# Patient Record
Sex: FEMALE | Race: WHITE
[De-identification: names, ages, dates, MRNs, and addresses within clinical notes are randomized per-mention and may not be internally consistent; named-entity substitution may affect disease eponyms.]

---

## 2017-01-31 ENCOUNTER — HOSPITAL ENCOUNTER (OUTPATIENT)
Dept: HOSPITAL 80 - FCATH | Age: 62
Setting detail: OBSERVATION
LOS: 1 days | Discharge: HOME | End: 2017-02-01
Attending: INTERNAL MEDICINE | Admitting: INTERNAL MEDICINE
Payer: COMMERCIAL

## 2017-01-31 DIAGNOSIS — I10: ICD-10-CM

## 2017-01-31 DIAGNOSIS — I47.1: Primary | ICD-10-CM

## 2017-01-31 DIAGNOSIS — E78.5: ICD-10-CM

## 2017-01-31 LAB
% IMMATURE GRANULYOCYTES: 0.3 % (ref 0–1.1)
ABSOLUTE IMMATURE GRANULOCYTES: 0.02 10^3/UL (ref 0–0.1)
ABSOLUTE NRBC COUNT: 0 10^3/UL (ref 0–0.01)
ADD DIFF?: NO
ADD MORPH?: NO
ADD SCAN?: NO
ANION GAP SERPL CALC-SCNC: 10 MEQ/L (ref 8–16)
ANION GAP SERPL CALC-SCNC: 9 MEQ/L (ref 8–16)
APTT BLD: 28.4 SEC (ref 23–38)
ATYPICAL LYMPHOCYTE FLAG: 0 (ref 0–99)
CALCIUM SERPL-MCNC: 8 MG/DL (ref 8.5–10.4)
CALCIUM SERPL-MCNC: 9.2 MG/DL (ref 8.5–10.4)
CHLORIDE SERPL-SCNC: 109 MEQ/L (ref 97–110)
CHLORIDE SERPL-SCNC: 112 MEQ/L (ref 97–110)
CO2 SERPL-SCNC: 22 MEQ/L (ref 22–31)
CO2 SERPL-SCNC: 24 MEQ/L (ref 22–31)
CREAT SERPL-MCNC: 0.6 MG/DL (ref 0.6–1)
CREAT SERPL-MCNC: 0.6 MG/DL (ref 0.6–1)
ERYTHROCYTE [DISTWIDTH] IN BLOOD BY AUTOMATED COUNT: 12.8 % (ref 11.5–15.2)
FRAGMENT RBC FLAG: 0 (ref 0–99)
GFR SERPL CREATININE-BSD FRML MDRD: > 60 ML/MIN/{1.73_M2}
GFR SERPL CREATININE-BSD FRML MDRD: > 60 ML/MIN/{1.73_M2}
GLUCOSE SERPL-MCNC: 80 MG/DL (ref 70–100)
GLUCOSE SERPL-MCNC: 82 MG/DL (ref 70–100)
HCT VFR BLD CALC: 42.1 % (ref 38–47)
HGB BLD-MCNC: 14.2 G/DL (ref 12.6–16.3)
INR PPP: 0.94 (ref 0.83–1.16)
LEFT SHIFT FLG: 0 (ref 0–99)
LIPEMIA HEMOLYSIS FLAG: 80 (ref 0–99)
MAGNESIUM SERPL-MCNC: 1.9 MG/DL (ref 1.6–2.3)
MAGNESIUM SERPL-MCNC: 2 MG/DL (ref 1.6–2.3)
MCH RBC BLDCO QN: 29.9 PG (ref 27.9–34.1)
MCHC RBC AUTO-ENTMCNC: 33.7 G/DL (ref 32.4–36.7)
MCV RBC AUTO: 88.6 FL (ref 81.5–99.8)
NRBC-AUTO%: 0 % (ref 0–0.2)
PLATELET # BLD: 237 10^3/UL (ref 150–400)
PLATELET CLUMPS FLAG: 10 (ref 0–99)
PMV BLD AUTO: 9.6 FL (ref 8.7–11.7)
POTASSIUM SERPL-SCNC: 3.7 MEQ/L (ref 3.5–5.2)
POTASSIUM SERPL-SCNC: 4.3 MEQ/L (ref 3.5–5.2)
PROTHROMBIN TIME: 12.5 SEC (ref 12–15)
RBC # BLD AUTO: 4.75 10^6/UL (ref 4.18–5.33)
SODIUM SERPL-SCNC: 143 MEQ/L (ref 134–144)
SODIUM SERPL-SCNC: 143 MEQ/L (ref 134–144)

## 2017-01-31 PROCEDURE — 93613 INTRACARDIAC EPHYS 3D MAPG: CPT

## 2017-01-31 PROCEDURE — 93306 TTE W/DOPPLER COMPLETE: CPT

## 2017-01-31 PROCEDURE — 02H73MZ INSERTION OF CARDIAC LEAD INTO LEFT ATRIUM, PERCUTANEOUS APPROACH: ICD-10-PCS | Performed by: INTERNAL MEDICINE

## 2017-01-31 PROCEDURE — 02K83ZZ MAP CONDUCTION MECHANISM, PERCUTANEOUS APPROACH: ICD-10-PCS | Performed by: INTERNAL MEDICINE

## 2017-01-31 PROCEDURE — C1731 CATH, EP, 20 OR MORE ELEC: HCPCS

## 2017-01-31 PROCEDURE — G0378 HOSPITAL OBSERVATION PER HR: HCPCS

## 2017-01-31 PROCEDURE — 93653 COMPRE EP EVAL TX SVT: CPT

## 2017-01-31 PROCEDURE — C1732 CATH, EP, DIAG/ABL, 3D/VECT: HCPCS

## 2017-01-31 PROCEDURE — 4A023FZ MEASUREMENT OF CARDIAC RHYTHM, PERCUTANEOUS APPROACH: ICD-10-PCS | Performed by: INTERNAL MEDICINE

## 2017-01-31 PROCEDURE — 025K3ZZ DESTRUCTION OF RIGHT VENTRICLE, PERCUTANEOUS APPROACH: ICD-10-PCS | Performed by: INTERNAL MEDICINE

## 2017-01-31 PROCEDURE — C1730 CATH, EP, 19 OR FEW ELECT: HCPCS

## 2017-01-31 PROCEDURE — 02563ZZ DESTRUCTION OF RIGHT ATRIUM, PERCUTANEOUS APPROACH: ICD-10-PCS | Performed by: INTERNAL MEDICINE

## 2017-01-31 PROCEDURE — 93005 ELECTROCARDIOGRAM TRACING: CPT

## 2017-01-31 PROCEDURE — 02573ZZ DESTRUCTION OF LEFT ATRIUM, PERCUTANEOUS APPROACH: ICD-10-PCS | Performed by: INTERNAL MEDICINE

## 2017-01-31 PROCEDURE — 5A1223Z PERFORMANCE OF CARDIAC PACING, CONTINUOUS: ICD-10-PCS | Performed by: INTERNAL MEDICINE

## 2017-01-31 PROCEDURE — 93621 COMP EP EVL L PAC&REC C SINS: CPT

## 2017-01-31 PROCEDURE — C1893 INTRO/SHEATH, FIXED,NON-PEEL: HCPCS

## 2017-01-31 PROCEDURE — 93623 PRGRMD STIMJ&PACG IV RX NFS: CPT

## 2017-01-31 NOTE — CPEKG
Heart Rate: 92

RR Interval: 652

P-R Interval: 144

QRSD Interval: 72

QT Interval: 376

QTC Interval: 466

P Axis: 66

QRS Axis: -11

T Wave Axis: 45

EKG Severity - BORDERLINE ECG -

EKG Impression: SINUS RHYTHM

EKG Impression: BORDERLINE T ABNORMALITIES, ANTERIOR LEADS

Electronically Signed By: Shea Chavez 31-Jan-2017 16:29:39

## 2017-01-31 NOTE — CPEKG
Heart Rate: 82

RR Interval: 732

P-R Interval: 124

QRSD Interval: 70

QT Interval: 384

QTC Interval: 449

P Axis: 42

QRS Axis: -31

T Wave Axis: 21

EKG Severity - BORDERLINE ECG -

EKG Impression: SINUS RHYTHM

EKG Impression: LEFT AXIS DEVIATION

EKG Impression: BORDERLINE R WAVE PROGRESSION, ANTERIOR LEADS

EKG Impression: BORDERLINE T ABNORMALITIES, ANTERIOR LEADS

Electronically Signed By: Shea Chavez 31-Jan-2017 12:40:45

## 2017-01-31 NOTE — EPPROC
Electrophysiology Procedure Note: 


ELECTROPHYSIOLOGIC STUDY AND   CATHETER MEDIATED ABLATION OF SLOW/FAST AV ERAN 

REENTRY TACHYCARDIA





PROCEDURES PERFORMED:





39755-25    EP evaluation with RA/RV/LA pace/record, with arrhythmia induction





71557-42    EP evaluation with RA/RV pace record, insert/reposition catheter, 

with arrhythmia induction





89698      Intracardiac catheter ablation, SVT arrhythmogenic focus 





52597      3D mapping





Fluoroscopy








INDICATION:





SVT





PROCEDURE:





Catheters & Anesthesia: 





The patient arrived in the Electrophysiology Laboratory in the fasting state.  

The right clavicular region, right groin, and left groin area were prepped and 

draped in the usual sterile manner.  Anesthesiologist Dr. LADARIUS Manzanares 

administered general anesthesia.   Appropriate non-invasive blood pressure, 

pulse oximetry and end-tidal CO2 monitoring was established.


All catheters were placed percutaneously using the modified Seldinger technique

, and advanced into position under fluoroscopic guidance. One #6 Macanese 

hexapolar non-deflectable electrode catheter was inserted into the right atrial 

appendage via the left femoral vein (2mm spacing; except the proximal ring 

which was 25cm from the tip - used for unipolar recordings).  One #7 Macanese 

deflectable octapolar electrode catheter was advanced to the His-bundle 

position via the left femoral vein (2mm spacing).  One #7 Macanese deflectable 

quadrapolar catheter was advanced to the anteroseptal right ventricle via the 

right femoral vein.   One #7 Macanese deflectable catheter with 10 pairs of 

electrodes was placed via the right femoral vein into the coronary sinus. 


Heparin was given to keep ACT > 200 s.





Programmed stimulation was performed from the right atrium, right ventricle and 

coronary sinus (left atrium).   Parahisian pacing demonstrated constant H-A 

interval with changing V-A intervals and stimulus-A intervals during capture 

and loss of capture of proximal RBB proving retrograde conduction over AV node.

  AVNRT was induced easily after bolus of isoproterenol 2 mcg/min.  Ventricular 

extrastimuli delivered during tachycardia without altering antegrade His bundle 

activation did not advance next atrial potential, indicating that the 

tachycardia was not utilizing an accessory pathway for retrograde conduction. 

VA interval was 15 ms. Post entrainment of the tachycardia from the ventricle, 

there was VAHV response.





Mapping of the right atrium and coronary sinus during AVNRT identified earliest 

atrial activation above the tendon of Cj at a level slightly posterior to 

the level of the His bundle, consistent with retrograde conduction over the 

fast AV eran pathway.    





A #8 Macanese deflectable quadrapolar electrode catheter (2mm-5mm-2mm spacing) 

with 4 mm tip electrode and sensor for the 3D mapping Carto system was advanced 

to the right atrium. 3 D mapping of the inter-atrial septum and coronary sinus 

was performed and location of the AV node was marked.





 A SL2 sheath was used.  RF applications were delivered to the region between 

the tricuspid annulus   and the coronary sinus ostium, at the level of the 

upper edge of the coronary sinus ostium. Radiofrequency applications were also 

delivered along the roof of the proximal coronary sinus.    Junctional rhythm 

occurred during all of the RF applications.





Programmed stimulation was continued   post ablation at baseline and during 

graded doses of isoproterenol upto 4mcg/min.   Sustained AVNRT was not 

inducible.  There were no echo beats. Nonsustained atrial tachycardia, 5 seconds

, 330 ms was seen x 1, not targeted for ablation.





The catheters were removed.  The long sheath was changed to a short 9 Fr 

sheath. The patient was transferred to the cardiovascular holding area in 

stable condition.  Vascular access sheaths were removed in the holding area.  

There were no apparent complications.





Results:





A.   Spontaneous Intervals:


Pre ablation     ms AH 60 ms HV 40 ms


Post ablation   ms AH 45 ms HV 40 ms





B.   Antegrade AV eran function (decremental pacing)


Pre ablation    FPERP 390 ms SPERP 340 ms WBB  ms then SVT


Post ablation    FPERP 360 ms   WBB  ms





C.   Retrograde AV eran function (decremental pacing)


Pre ablation    FPERP 360 ms   WBB  ms 





D.    Arrhythmias:


Sustained slow/fast AVNRT


Cycle length 300 ms, AH interval 260 ms, HA interval 40 ms 


VA interval 15 ms








CONCLUSIONS





1.   AV eran reentrant tachycardia using the slow AV eran pathway for 

antegrade conduction and the fast AV eran pathway for retrograde conduction. (

Slow/fast AVNRT). 


2.   Successful ablation of the slow AV eran pathway with elimination of 1:1 

antegrade conduction over the slow AV eran pathway, all retrograde conduction 

over the slow AV eran pathway and the inducibility of AVNRT.


3.   No complications.








Patient Problems: 


 Problems











Problem Status Diagnosed


 


Supraventricular tachycardia Acute 


 


TIA (transient ischemic attack) Acute

## 2017-02-01 VITALS
RESPIRATION RATE: 12 BRPM | HEART RATE: 75 BPM | TEMPERATURE: 98 F | SYSTOLIC BLOOD PRESSURE: 125 MMHG | OXYGEN SATURATION: 94 % | DIASTOLIC BLOOD PRESSURE: 81 MMHG

## 2017-02-01 LAB
% IMMATURE GRANULYOCYTES: 0.3 % (ref 0–1.1)
ABSOLUTE IMMATURE GRANULOCYTES: 0.02 10^3/UL (ref 0–0.1)
ABSOLUTE NRBC COUNT: 0 10^3/UL (ref 0–0.01)
ADD DIFF?: NO
ADD MORPH?: NO
ADD SCAN?: NO
ANION GAP SERPL CALC-SCNC: 5 MEQ/L (ref 8–16)
ATYPICAL LYMPHOCYTE FLAG: 0 (ref 0–99)
CALCIUM SERPL-MCNC: 8.8 MG/DL (ref 8.5–10.4)
CHLORIDE SERPL-SCNC: 108 MEQ/L (ref 97–110)
CO2 SERPL-SCNC: 26 MEQ/L (ref 22–31)
CREAT SERPL-MCNC: 0.7 MG/DL (ref 0.6–1)
CREATINE KINASE-MB FRACTION: 1.5 NG/ML (ref 0–3.19)
ERYTHROCYTE [DISTWIDTH] IN BLOOD BY AUTOMATED COUNT: 13.1 % (ref 11.5–15.2)
FRAGMENT RBC FLAG: 0 (ref 0–99)
GFR SERPL CREATININE-BSD FRML MDRD: > 60 ML/MIN/{1.73_M2}
GLUCOSE SERPL-MCNC: 99 MG/DL (ref 70–100)
HCT VFR BLD CALC: 41.4 % (ref 38–47)
HGB BLD-MCNC: 13.5 G/DL (ref 12.6–16.3)
INR PPP: 0.98 (ref 0.83–1.16)
LEFT SHIFT FLG: 0 (ref 0–99)
LIPEMIA HEMOLYSIS FLAG: 80 (ref 0–99)
MCH RBC BLDCO QN: 29.9 PG (ref 27.9–34.1)
MCHC RBC AUTO-ENTMCNC: 32.6 G/DL (ref 32.4–36.7)
MCV RBC AUTO: 91.6 FL (ref 81.5–99.8)
NRBC-AUTO%: 0 % (ref 0–0.2)
PLATELET # BLD: 237 10^3/UL (ref 150–400)
PLATELET CLUMPS FLAG: 0 (ref 0–99)
PMV BLD AUTO: 9.9 FL (ref 8.7–11.7)
POTASSIUM SERPL-SCNC: 5.1 MEQ/L (ref 3.5–5.2)
PROTHROMBIN TIME: 12.9 SEC (ref 12–15)
RBC # BLD AUTO: 4.52 10^6/UL (ref 4.18–5.33)
SODIUM SERPL-SCNC: 139 MEQ/L (ref 134–144)
TROPONIN I SERPL-MCNC: 0.17 NG/ML (ref 0–0.03)

## 2017-02-01 NOTE — ECHO
2900032.003BLD

[id number]



+---------------------------------------------------+      [address]

:                                                   :       [place] [address]

:                                                   :           [phone]

+---------------------------------------------------+       Fax [phone]



                       Adult Echocardiographic Report



+----------------------------------------------------------------------------

---+

:Name: DARRON LEARY Anastacio Date: 2017 08:39 AM                          

   :

:MRN: [id number]    Hospital Admission Number: [id number] Location: 

222:

:: 1955    Gender: Female                         Height: 64 in     

   :

:Age: 62 yrs        Race: WH                               Weight: 171 lb    

   :

:Reason For Study: Eval LV FX                                                

   :

:                                                          BSA: 1.8 meters2  

   :

:History: Post Ablation                                                      

   :

+----------------------------------------------------------------------------

---+

MMode/2D Measurements & Calculations

IVSd: 0.88 cm   LVIDd: 4.5 cm  FS: 43.8 %            Ao root diam: 3.2 cm

LVPWd: 0.95 cm  LVIDs: 2.6 cm  EDV(Teich): 94.8 ml   ACS: 1.6 cm

                               ESV(Teich): 23.6 ml   LA dimension: 2.8 cm

                               EF(Teich): 75.1 %



Normal Measurement Values:

+----------------------------------------------------------------------------

----------------------------------+

:LVIDd (3.5-5.7cm)    IVSd (0.6-1.1cm)     LVPWd (0.6-1.1cm)     Aortic Root 

(2.0-3.7cm)Left Atrium (1.5-4.0cm):

:LV Vol(d) (76-115ml) LV Vol(s) (29-48ml)  Ejec Fraction (50-65%)PV Mauricio (0.6-

1.2m/s)    TV Mauricio (0.4-1.0m/s)    :

:MV E Mauricio (0.8-1.0m/s)MV A Mauricio (0.3-1.0m/s)LVOT Maruicio (0.7-1.2m/s) Asc Ao Mauricio (

0.9-1.8m/s)                       :

+----------------------------------------------------------------------------

----------------------------------+

Doppler Measurements & Calculations

MV E max mauricio:       Ao V2 max:         LV V1 max:        TR max mauricio:

115.7 cm/sec        198.2 cm/sec       110.2 cm/sec      266.0 cm/sec

MV A max mauricio:       Ao max PG:         LV V1 max PG:     TR max P.4 cm/sec        15.7 mmHg          4.9 mmHg          28.3 mmHg

MV E/A: 1.0                                              RAP systole:

                                                         5.0 mmHg

                                                         RVSP(TR): 33.3 mmHg



Left Ventricle

The left ventricle is normal in size. There is normal left ventricular wall

thickness. The left ventricular ejection fraction is normal. There is

Doppler evidence for diastolic dysfunction. Ejection Fraction = 75%. The

left ventricular ejection fraction is calculated at 75.1 %. The left

ventricular wall motion is normal.





Right Ventricle

The right ventricle is normal in size and function.





Atria

The left atrial size is normal. Right atrial size is normal.



Mitral Valve

The mitral valve is normal in structure and function. There is trace mitral

regurgitation.



Tricuspid Valve

The tricuspid valve is normal in structure and function. There is trace

tricuspid regurgitation. Right ventricular systolic pressure is normal.



Aortic Valve

The aortic valve is bicuspid. There is no aortic stenosis. There is no

aortic insufficiency.



Pulmonic Valve

The pulmonic valve is not well visualized. There is no pulmonic valvular

regurgitation.





Great Vessels

The ascending aorta is dilated at 3.6cm.



Pericardium/Pleural

There is no pericardial effusion.



Conclusion

A complete two-dimensional transthoracic echocardiogram was performed (2D,

M-mode, Doppler and color flow Doppler).



(1) Left ventricular systolic ejection fraction was normal (75%)

- normal wall motion

(2) No left ventricular hypertrophy

(3) Diastolic dysfunction was present

(4) Normal right ventricular size and function

(5) Normal atrial dimensions

(6) Grossly normal mitral valve with physiologic mitral regurgitation

(7) Possible bicuspid aortic valve (images were not clear) without overt

insufficiency or sclerosis noted

(8) Grossly normal tricuspid valve

- RVSP was within normal limits

(9) Poor visualization of the pulmonic valve

(10) No pericardial effusion

(11) In comparison to prior echocardiogram, no clear changes noted - aortic

valve was not clearly imaged in prior study from Dec 2016.

(12) Consider MERCEDES to better visualize the aortic valve







_____________________________________________________________________________





Final Reading Physician:

                        Kumar Gibbs signed on 2017 09:44 AM

Ordering Physician: Bg Aguilar

Performed By: Samy Hamilton, RDCS

## 2017-02-01 NOTE — GDS
[f rep st]



                                                             DISCHARGE SUMMARY





DISCHARGE DIAGNOSES:  

1.  AV eran reentrant tachycardia, status post ablation.

2.  Possible bicuspid aortic valve with ascending thoracic aorta measuring 3.6 cm.

3.  Hyperlipidemia.

4.  Hypertension.



HOSPITAL COURSE:  For a detailed H and P, please see prior dictation.  Briefly, the patient is a 62-y
ear-old female with a history of SVT with associated lightheadedness.  She was started on metoprolol 
but continued to have breakthrough episodes of SVT.  Ultimately, she decided she would like to procee
d with an EP study and ablation.  This was performed by Dr. Bg Aguilar on January 31, 2017.  She was 
identified to have AV eran reentrant tachycardia, which was ablated.  Her procedure was uncomplicate
d.  The following morning, she denied any palpitations or chest discomfort.  She was monitored on tel
emetry and remained in normal sinus rhythm.  Her EKG the day of discharge revealed normal sinus rhyth
m with T-wave flattening in the inferior and anterior leads, which is unchanged.  Her troponin peaked
 at 0.166.  An echocardiogram revealed preserved LV function without any evidence of pericardial effu
lulu.  There is a question as to whether or not she has a bicuspid aortic valve.  Her son does have a
 history of a bicuspid aortic valve.



PHYSICAL EXAMINATION:  GENERAL:  Patient appears in no acute distress.  VITAL SIGNS:  Blood pressure 
125/81, heart rate 75, oxygen saturation of 94% on room air, afebrile.  LUNGS:  Clear to auscultation
.  No wheezes, rhonchi, or crackles auscultated.  CARDIAC:  Regular rate and rhythm without any murmu
rs, rubs, or gallops appreciated.  EXTREMITIES:  Groins bilaterally where access was obtained for the
 EP study and ablation are clean and intact without any evidence of infection or hematoma.



DISCHARGE MEDICATIONS:  Metoprolol XL 50 mg daily, Vagifem Monday and Friday, Lipitor 40 mg daily, as
pirin 81 mg daily.



PLAN:  The patient is currently stable and ready for discharge home.  She has been given groin precau
tions.  She will remain on aspirin for a minimum of 6 weeks.  She is scheduled to follow up with Dr. Bg Aguilar in 1 month.  There is concern as to whether or not she has a bicuspid aortic valve, and th
erefore she will need a MERCEDES.  We will plan to have a nurse from our office call her to schedule this 
within the next 2-4 weeks.  She was started on Lipitor a month ago and does not currently have a PCP.
  She will need to either schedule a consult with a PCP or follow up with Cardiology to recheck fasti
ng lipid profile and liver function tests within the next 1-2 months.





Job #:  [id number]

## 2017-02-01 NOTE — CPEKG
Heart Rate: 79

RR Interval: 759

P-R Interval: 140

QRSD Interval: 70

QT Interval: 384

QTC Interval: 441

P Axis: 53

QRS Axis: -8

T Wave Axis: 41

EKG Severity - NORMAL ECG -

EKG Impression: SINUS RHYTHM

Electronically Signed By: Shea Chavez 01-Feb-2017 09:51:35

## 2017-02-13 ENCOUNTER — HOSPITAL ENCOUNTER (OUTPATIENT)
Dept: HOSPITAL 80 - FCATH | Age: 62
Discharge: HOME | End: 2017-02-13
Attending: INTERNAL MEDICINE
Payer: COMMERCIAL

## 2017-02-13 DIAGNOSIS — Q23.1: Primary | ICD-10-CM

## 2017-02-13 PROCEDURE — B246ZZ4 ULTRASONOGRAPHY OF RIGHT AND LEFT HEART, TRANSESOPHAGEAL: ICD-10-PCS | Performed by: INTERNAL MEDICINE

## 2017-02-14 NOTE — ECHO
[id number]

[id number]



+---------------------------------------------------+      [address]

:                                                   :       [place] [address]

:                                                   :           [phone]

+---------------------------------------------------+       Fax [phone]

                  Transesophageal Echocardiographic Report

+----------------------------------------------------------------------------

---+

:Name: DARRON LEARYsavanna Date: 2017 08:43 AM                          

   :

:MRN: [id number]    Hospital Admission Number: [id number] Location: 

[facility]:

:: 1955    Gender: Female                                           

   :

:Age: 62 yrs        Race: WH                                                 

   :

:Reason For Study: Eval for bicuspid AV                                      

   :

+----------------------------------------------------------------------------

---+



Left Ventricle

Left ventricular systolic function is normal.







Atria

Injection of contrast documented no interatrial shunt. No thrombus is

detected in the left atrial appendage.





Mitral Valve

There is trace mitral regurgitation.



Tricuspid Valve

There is mild tricuspid regurgitation.



Aortic Valve

AV is bicuspid with fused raphe. Trace aortic regurgitation.



Pulmonic Valve

The pulmonic valve is normal in structure and function. There is no pulmonic

valvular regurgitation.



Vessels

Mildly dilated ascending aorta.





Conclusion

A 2D transesophageal echocardiogram with color flow Doppler was performed.

Left ventricular systolic function is normal.

Injection of contrast documented no interatrial shunt.

No thrombus is detected in the left atrial appendage.

There is trace mitral regurgitation.

There is mild tricuspid regurgitation.

AV is bicuspid with fused raphe.

Trace aortic regurgitation.

Mildly dilated ascending aorta.



_____________________________________________________________________________







Final Reading Physician:

                        Bg Aguilar MD  electronically signed on 2017

                        08:35 AM

Ordering Physician: Bg Aguilar

Performed By: Shakira Way RDCS

## 2017-02-16 ENCOUNTER — HOSPITAL ENCOUNTER (OUTPATIENT)
Dept: HOSPITAL 80 - FIMAGING | Age: 62
End: 2017-02-16
Attending: INTERNAL MEDICINE
Payer: COMMERCIAL

## 2017-02-16 DIAGNOSIS — I77.819: Primary | ICD-10-CM

## 2017-09-12 ENCOUNTER — HOSPITAL ENCOUNTER (OUTPATIENT)
Dept: HOSPITAL 80 - FIMAGING | Age: 62
End: 2017-09-12
Attending: NURSE PRACTITIONER
Payer: COMMERCIAL

## 2017-09-12 DIAGNOSIS — Z12.31: Primary | ICD-10-CM

## 2017-09-12 PROCEDURE — G0202 SCR MAMMO BI INCL CAD: HCPCS

## 2018-10-16 ENCOUNTER — HOSPITAL ENCOUNTER (OUTPATIENT)
Dept: HOSPITAL 80 - FIMAGING | Age: 63
End: 2018-10-16
Attending: NURSE PRACTITIONER
Payer: COMMERCIAL

## 2018-10-16 DIAGNOSIS — Z12.31: Primary | ICD-10-CM
